# Patient Record
Sex: FEMALE | URBAN - METROPOLITAN AREA
[De-identification: names, ages, dates, MRNs, and addresses within clinical notes are randomized per-mention and may not be internally consistent; named-entity substitution may affect disease eponyms.]

---

## 2023-10-30 LAB
C. TRACHOMATIS, EXTERNAL RESULT: NEGATIVE
N. GONORRHOEAE, EXTERNAL RESULT: NEGATIVE

## 2023-11-16 LAB
ABO, EXTERNAL RESULT: NORMAL
HEP B, EXTERNAL RESULT: NEGATIVE
HIV, EXTERNAL RESULT: NEGATIVE
RPR, EXTERNAL RESULT: NORMAL
RUBELLA TITER, EXTERNAL RESULT: NORMAL

## 2024-06-12 LAB — GBS, EXTERNAL RESULT: NEGATIVE

## 2024-07-03 ENCOUNTER — HOSPITAL ENCOUNTER (OUTPATIENT)
Facility: HOSPITAL | Age: 19
Setting detail: OBSERVATION
Discharge: HOME | End: 2024-07-04
Attending: OBSTETRICS & GYNECOLOGY | Admitting: OBSTETRICS & GYNECOLOGY

## 2024-07-03 VITALS
SYSTOLIC BLOOD PRESSURE: 118 MMHG | OXYGEN SATURATION: 100 % | HEART RATE: 81 BPM | TEMPERATURE: 98.2 F | DIASTOLIC BLOOD PRESSURE: 75 MMHG | RESPIRATION RATE: 18 BRPM

## 2024-07-03 PROBLEM — Z3A.39 39 WEEKS GESTATION OF PREGNANCY: Status: ACTIVE | Noted: 2024-07-03

## 2024-07-03 PROBLEM — O47.1 FALSE LABOR AFTER 37 WEEKS OF GESTATION WITHOUT DELIVERY: Status: ACTIVE | Noted: 2024-07-03

## 2024-07-03 PROCEDURE — G0378 HOSPITAL OBSERVATION PER HR: HCPCS

## 2024-07-03 PROCEDURE — G0379 DIRECT REFER HOSPITAL OBSERV: HCPCS

## 2024-07-03 PROCEDURE — 2580000003 HC RX 258: Performed by: ADVANCED PRACTICE MIDWIFE

## 2024-07-03 RX ORDER — ONDANSETRON 2 MG/ML
4 INJECTION INTRAMUSCULAR; INTRAVENOUS EVERY 6 HOURS PRN
Status: DISCONTINUED | OUTPATIENT
Start: 2024-07-03 | End: 2024-07-04 | Stop reason: HOSPADM

## 2024-07-03 RX ORDER — ACETAMINOPHEN 500 MG
1000 TABLET ORAL EVERY 8 HOURS PRN
Status: DISCONTINUED | OUTPATIENT
Start: 2024-07-03 | End: 2024-07-04 | Stop reason: HOSPADM

## 2024-07-03 RX ORDER — SODIUM CHLORIDE, SODIUM LACTATE, POTASSIUM CHLORIDE, AND CALCIUM CHLORIDE .6; .31; .03; .02 G/100ML; G/100ML; G/100ML; G/100ML
1000 INJECTION, SOLUTION INTRAVENOUS ONCE
Status: COMPLETED | OUTPATIENT
Start: 2024-07-03 | End: 2024-07-03

## 2024-07-03 RX ORDER — ONDANSETRON 4 MG/1
4 TABLET, ORALLY DISINTEGRATING ORAL EVERY 8 HOURS PRN
Status: DISCONTINUED | OUTPATIENT
Start: 2024-07-03 | End: 2024-07-04 | Stop reason: HOSPADM

## 2024-07-03 RX ADMIN — SODIUM CHLORIDE, POTASSIUM CHLORIDE, SODIUM LACTATE AND CALCIUM CHLORIDE 1000 ML: 600; 310; 30; 20 INJECTION, SOLUTION INTRAVENOUS at 21:13

## 2024-07-03 RX ADMIN — SODIUM CHLORIDE, POTASSIUM CHLORIDE, SODIUM LACTATE AND CALCIUM CHLORIDE 1000 ML: 600; 310; 30; 20 INJECTION, SOLUTION INTRAVENOUS at 22:17

## 2024-07-04 PROCEDURE — G0378 HOSPITAL OBSERVATION PER HR: HCPCS

## 2024-07-04 NOTE — H&P
Rate:     Baseline: 140 bpm    Variability: Moderate    Accelerations: Present (15 x 15 bpm)    Decelerations: None  Category: 1  Pelvic:    Membranes:Intact   Cervical Exam:     Position Posterior    Condition Soft  Presentation Vertex  Dilation 3 cms    Effacement 80 %     Station -2    Exam by RN   Pelvis is adequate for vaginal delivery.  Extremites:   Trace bilateral pedal edema.  Full range of motion.  Neuro:    Alert. Oriented.  CN 2 - 12 intact.  Motor and sensory exam grossly normal.      Prenatal Labs   No results found for: \"ABORH\", \"RUBELLAEXT\", \"GRBSEXT\", \"HBSAGEXT\", \"HIVEXT\", \"RPREXT\", \"GONNOEXT\"  No results found for this or any previous visit (from the past 12 hour(s)).      Assessment/Plan:     Principal Problem:    False labor after 37 weeks of gestation without delivery  Active Problems:    39 weeks gestation of pregnancy  Resolved Problems:    * No resolved hospital problems. *      Early versus false  labor  Will observe      Nathan Nguyen MD  7/3/2024

## 2024-07-04 NOTE — PROGRESS NOTES
S: Pt reports she feels her contractions, but that they are not as strong as before/ Pt admits only having drank one large zaynab tumbler in 24 hours, and has been outside today as well. Pt reports +FM. Denies any pelvic pressure, LOF, or VB. Pt reports desiring a membrane sweep.     O:   Vital Signs = WNL   Cat 1 FHR tracing:  Baseline 130's, acels present, no decels, moderate variability. Contractions q 5-7 min apart, moderate strength upon palpation.       SVE: Unchanged.  3.5 cm/ 80%/ -2/ intact BOW/ medium/ posterior.     A:   Cat 1 FHR tracing   Early Labor/ False Labor   Dehydration     P:   Patient is  being discharged home at this time.   Review labor s/s  and precautions and when to come back to the hospital.   Reviewed to increase her water intake to 80 oz -100 oz of water / electrolyte water per day.   Reviewed FKC's.     Pt to go to her scheduled [facility] visit early next week or sooner (this Friday 07/06/2024) if needed.     Pt and her partner verbalizes understanding and agrees with POC.

## 2024-07-04 NOTE — PROGRESS NOTES
2028: Patient arrives to unit alert and ambulatory with c/o contractions every 5-7 min that started around 3-4 pm today. Patient reports positive fetal movement, and denies LOF and vaginal bleeding at this time. She states she was last seen in the office on 7/1 where she was 3/70/-2 and a membrane sweep was performed. This RN obtain SVE at this time, and patient is unchanged from her last exam in office, 3/70/-2.    2107: This RN notified MILENA Swan of patient's arrival and current status. CNM VORB to give patient two LR fluid bolus' and recheck patient via SVE 2 hrs post initial SVE check.     2243: This RN and MD Nguyen at patient's bedside at this time for patient assessment, education, and POC.     2308: This RN performed SVE at this time, patient uncharged at this time, 3/70/-2.      2318: This RN notified MILENA Swan of patient current condition at this time. CNM to bed at bedside shortly.   2328: This RN and MILENA Swan at patient's bedside at this time for patient assessment, education, and POC.     2347: CNM VORB to discharge patient at this time.     0052: This RN at patient bedside at this time for discharge instructions. Patient and family have no further questions at this time.     0057: Patient and family leaving unit at this time alert and ambulatory.

## 2024-07-08 ENCOUNTER — ANESTHESIA EVENT (OUTPATIENT)
Facility: HOSPITAL | Age: 19
End: 2024-07-08
Payer: COMMERCIAL

## 2024-07-08 ENCOUNTER — ANESTHESIA (OUTPATIENT)
Facility: HOSPITAL | Age: 19
End: 2024-07-08
Payer: COMMERCIAL

## 2024-07-08 ENCOUNTER — HOSPITAL ENCOUNTER (INPATIENT)
Facility: HOSPITAL | Age: 19
LOS: 2 days | Discharge: HOME | End: 2024-07-10
Attending: OBSTETRICS & GYNECOLOGY | Admitting: OBSTETRICS & GYNECOLOGY
Payer: COMMERCIAL

## 2024-07-08 PROBLEM — O47.1 FALSE LABOR AFTER 37 WEEKS OF GESTATION WITHOUT DELIVERY: Status: RESOLVED | Noted: 2024-07-03 | Resolved: 2024-07-08

## 2024-07-08 PROBLEM — Z3A.40 40 WEEKS GESTATION OF PREGNANCY: Status: ACTIVE | Noted: 2024-07-03

## 2024-07-08 PROBLEM — O47.9 UTERINE CONTRACTIONS: Status: ACTIVE | Noted: 2024-07-08

## 2024-07-08 LAB
BASOPHILS # BLD: 0 K/UL (ref 0–0.1)
BASOPHILS NFR BLD: 0 % (ref 0–1)
DIFFERENTIAL METHOD BLD: ABNORMAL
EOSINOPHIL # BLD: 0 K/UL (ref 0–0.4)
EOSINOPHIL NFR BLD: 0 % (ref 0–7)
ERYTHROCYTE [DISTWIDTH] IN BLOOD BY AUTOMATED COUNT: 12.7 % (ref 11.5–14.5)
HCT VFR BLD AUTO: 39.5 % (ref 35–47)
HGB BLD-MCNC: 13.7 G/DL (ref 11.5–16)
IMM GRANULOCYTES # BLD AUTO: 0.1 K/UL (ref 0–0.04)
IMM GRANULOCYTES NFR BLD AUTO: 0 % (ref 0–0.5)
LYMPHOCYTES # BLD: 1.7 K/UL (ref 0.8–3.5)
LYMPHOCYTES NFR BLD: 14 % (ref 12–49)
MCH RBC QN AUTO: 34.3 PG (ref 26–34)
MCHC RBC AUTO-ENTMCNC: 34.7 G/DL (ref 30–36.5)
MCV RBC AUTO: 98.8 FL (ref 80–99)
MONOCYTES # BLD: 0.8 K/UL (ref 0–1)
MONOCYTES NFR BLD: 7 % (ref 5–13)
NEUTS SEG # BLD: 9.1 K/UL (ref 1.8–8)
NEUTS SEG NFR BLD: 79 % (ref 32–75)
NRBC # BLD: 0 K/UL (ref 0–0.01)
NRBC BLD-RTO: 0 PER 100 WBC
PLATELET # BLD AUTO: 221 K/UL (ref 150–400)
PMV BLD AUTO: 9.8 FL (ref 8.9–12.9)
RBC # BLD AUTO: 4 M/UL (ref 3.8–5.2)
WBC # BLD AUTO: 11.7 K/UL (ref 3.6–11)

## 2024-07-08 PROCEDURE — 86922 COMPATIBILITY TEST ANTIGLOB: CPT

## 2024-07-08 PROCEDURE — 2500000003 HC RX 250 WO HCPCS: Performed by: NURSE ANESTHETIST, CERTIFIED REGISTERED

## 2024-07-08 PROCEDURE — 6360000002 HC RX W HCPCS: Performed by: NURSE ANESTHETIST, CERTIFIED REGISTERED

## 2024-07-08 PROCEDURE — 36415 COLL VENOUS BLD VENIPUNCTURE: CPT

## 2024-07-08 PROCEDURE — 86644 CMV ANTIBODY: CPT

## 2024-07-08 PROCEDURE — 86920 COMPATIBILITY TEST SPIN: CPT

## 2024-07-08 PROCEDURE — 7210000100 HC LABOR FEE PER 1 HR

## 2024-07-08 PROCEDURE — 51702 INSERT TEMP BLADDER CATH: CPT

## 2024-07-08 PROCEDURE — 85025 COMPLETE CBC W/AUTO DIFF WBC: CPT

## 2024-07-08 PROCEDURE — 86870 RBC ANTIBODY IDENTIFICATION: CPT

## 2024-07-08 PROCEDURE — 2580000003 HC RX 258: Performed by: OBSTETRICS & GYNECOLOGY

## 2024-07-08 PROCEDURE — 86850 RBC ANTIBODY SCREEN: CPT

## 2024-07-08 PROCEDURE — G0378 HOSPITAL OBSERVATION PER HR: HCPCS

## 2024-07-08 PROCEDURE — 86921 COMPATIBILITY TEST INCUBATE: CPT

## 2024-07-08 PROCEDURE — 1100000000 HC RM PRIVATE

## 2024-07-08 PROCEDURE — 3700000025 EPIDURAL BLOCK: Performed by: ANESTHESIOLOGY

## 2024-07-08 PROCEDURE — G0379 DIRECT REFER HOSPITAL OBSERV: HCPCS

## 2024-07-08 PROCEDURE — 00HU33Z INSERTION OF INFUSION DEVICE INTO SPINAL CANAL, PERCUTANEOUS APPROACH: ICD-10-PCS | Performed by: ANESTHESIOLOGY

## 2024-07-08 PROCEDURE — 86901 BLOOD TYPING SEROLOGIC RH(D): CPT

## 2024-07-08 PROCEDURE — 86780 TREPONEMA PALLIDUM: CPT

## 2024-07-08 PROCEDURE — 86900 BLOOD TYPING SEROLOGIC ABO: CPT

## 2024-07-08 RX ORDER — ONDANSETRON 2 MG/ML
4 INJECTION INTRAMUSCULAR; INTRAVENOUS EVERY 6 HOURS PRN
Status: DISCONTINUED | OUTPATIENT
Start: 2024-07-08 | End: 2024-07-10 | Stop reason: HOSPADM

## 2024-07-08 RX ORDER — ONDANSETRON 4 MG/1
4 TABLET, ORALLY DISINTEGRATING ORAL EVERY 6 HOURS PRN
Status: DISCONTINUED | OUTPATIENT
Start: 2024-07-08 | End: 2024-07-10 | Stop reason: HOSPADM

## 2024-07-08 RX ORDER — METHYLERGONOVINE MALEATE 0.2 MG/ML
200 INJECTION INTRAVENOUS PRN
Status: DISCONTINUED | OUTPATIENT
Start: 2024-07-08 | End: 2024-07-10 | Stop reason: HOSPADM

## 2024-07-08 RX ORDER — TERBUTALINE SULFATE 1 MG/ML
0.25 INJECTION, SOLUTION SUBCUTANEOUS
Status: ACTIVE | OUTPATIENT
Start: 2024-07-08 | End: 2024-07-09

## 2024-07-08 RX ORDER — BUPIVACAINE HYDROCHLORIDE 2.5 MG/ML
INJECTION, SOLUTION EPIDURAL; INFILTRATION; INTRACAUDAL PRN
Status: DISCONTINUED | OUTPATIENT
Start: 2024-07-08 | End: 2024-07-09 | Stop reason: SDUPTHER

## 2024-07-08 RX ORDER — MISOPROSTOL 200 UG/1
400 TABLET ORAL PRN
Status: DISCONTINUED | OUTPATIENT
Start: 2024-07-08 | End: 2024-07-10 | Stop reason: HOSPADM

## 2024-07-08 RX ORDER — FENTANYL CITRATE 50 UG/ML
50 INJECTION, SOLUTION INTRAMUSCULAR; INTRAVENOUS
Status: DISCONTINUED | OUTPATIENT
Start: 2024-07-08 | End: 2024-07-10 | Stop reason: HOSPADM

## 2024-07-08 RX ORDER — NALOXONE HYDROCHLORIDE 0.4 MG/ML
INJECTION, SOLUTION INTRAMUSCULAR; INTRAVENOUS; SUBCUTANEOUS PRN
Status: DISCONTINUED | OUTPATIENT
Start: 2024-07-08 | End: 2024-07-10 | Stop reason: HOSPADM

## 2024-07-08 RX ORDER — SODIUM CHLORIDE 9 MG/ML
25 INJECTION, SOLUTION INTRAVENOUS PRN
Status: DISCONTINUED | OUTPATIENT
Start: 2024-07-08 | End: 2024-07-10 | Stop reason: HOSPADM

## 2024-07-08 RX ORDER — SODIUM CHLORIDE, SODIUM LACTATE, POTASSIUM CHLORIDE, CALCIUM CHLORIDE 600; 310; 30; 20 MG/100ML; MG/100ML; MG/100ML; MG/100ML
INJECTION, SOLUTION INTRAVENOUS CONTINUOUS
Status: DISCONTINUED | OUTPATIENT
Start: 2024-07-08 | End: 2024-07-10 | Stop reason: HOSPADM

## 2024-07-08 RX ORDER — EPHEDRINE SULFATE/0.9% NACL/PF 25 MG/5 ML
10 SYRINGE (ML) INTRAVENOUS ONCE
Status: DISCONTINUED | OUTPATIENT
Start: 2024-07-08 | End: 2024-07-10 | Stop reason: HOSPADM

## 2024-07-08 RX ORDER — CARBOPROST TROMETHAMINE 250 UG/ML
250 INJECTION, SOLUTION INTRAMUSCULAR PRN
Status: DISCONTINUED | OUTPATIENT
Start: 2024-07-08 | End: 2024-07-10 | Stop reason: HOSPADM

## 2024-07-08 RX ORDER — SODIUM CHLORIDE, SODIUM LACTATE, POTASSIUM CHLORIDE, AND CALCIUM CHLORIDE .6; .31; .03; .02 G/100ML; G/100ML; G/100ML; G/100ML
500 INJECTION, SOLUTION INTRAVENOUS PRN
Status: DISCONTINUED | OUTPATIENT
Start: 2024-07-08 | End: 2024-07-10 | Stop reason: HOSPADM

## 2024-07-08 RX ORDER — TRANEXAMIC ACID 10 MG/ML
1000 INJECTION, SOLUTION INTRAVENOUS
Status: ACTIVE | OUTPATIENT
Start: 2024-07-08 | End: 2024-07-09

## 2024-07-08 RX ORDER — CETIRIZINE HYDROCHLORIDE 5 MG/1
5 TABLET ORAL DAILY
COMMUNITY

## 2024-07-08 RX ORDER — SODIUM CHLORIDE 0.9 % (FLUSH) 0.9 %
5-40 SYRINGE (ML) INJECTION PRN
Status: DISCONTINUED | OUTPATIENT
Start: 2024-07-08 | End: 2024-07-10 | Stop reason: HOSPADM

## 2024-07-08 RX ORDER — LIDOCAINE HYDROCHLORIDE 10 MG/ML
INJECTION, SOLUTION INFILTRATION; PERINEURAL PRN
Status: DISCONTINUED | OUTPATIENT
Start: 2024-07-08 | End: 2024-07-09 | Stop reason: SDUPTHER

## 2024-07-08 RX ORDER — FENTANYL/BUPIVACAINE/NS/PF 2-1250MCG
10 PLASTIC BAG, INJECTION (ML) INJECTION CONTINUOUS
Status: DISCONTINUED | OUTPATIENT
Start: 2024-07-08 | End: 2024-07-10 | Stop reason: HOSPADM

## 2024-07-08 RX ORDER — SODIUM CHLORIDE 0.9 % (FLUSH) 0.9 %
5-40 SYRINGE (ML) INJECTION EVERY 12 HOURS SCHEDULED
Status: DISCONTINUED | OUTPATIENT
Start: 2024-07-08 | End: 2024-07-10 | Stop reason: HOSPADM

## 2024-07-08 RX ORDER — ACETAMINOPHEN 325 MG/1
650 TABLET ORAL EVERY 4 HOURS PRN
Status: DISCONTINUED | OUTPATIENT
Start: 2024-07-08 | End: 2024-07-10 | Stop reason: HOSPADM

## 2024-07-08 RX ADMIN — SODIUM CHLORIDE, POTASSIUM CHLORIDE, SODIUM LACTATE AND CALCIUM CHLORIDE 500 ML: 600; 310; 30; 20 INJECTION, SOLUTION INTRAVENOUS at 21:29

## 2024-07-08 RX ADMIN — LIDOCAINE HYDROCHLORIDE 3 ML: 10; .005 INJECTION, SOLUTION EPIDURAL; INFILTRATION; INTRACAUDAL; PERINEURAL at 21:57

## 2024-07-08 RX ADMIN — Medication 10 ML/HR: at 22:14

## 2024-07-08 RX ADMIN — LIDOCAINE HYDROCHLORIDE 3 ML: 10 INJECTION, SOLUTION INFILTRATION; PERINEURAL at 21:51

## 2024-07-08 RX ADMIN — SODIUM CHLORIDE, POTASSIUM CHLORIDE, SODIUM LACTATE AND CALCIUM CHLORIDE: 600; 310; 30; 20 INJECTION, SOLUTION INTRAVENOUS at 22:02

## 2024-07-08 RX ADMIN — BUPIVACAINE HYDROCHLORIDE 6 ML: 2.5 INJECTION, SOLUTION EPIDURAL; INFILTRATION; INTRACAUDAL; PERINEURAL at 22:02

## 2024-07-08 RX ADMIN — SODIUM CHLORIDE, POTASSIUM CHLORIDE, SODIUM LACTATE AND CALCIUM CHLORIDE 500 ML: 600; 310; 30; 20 INJECTION, SOLUTION INTRAVENOUS at 19:47

## 2024-07-08 ASSESSMENT — ENCOUNTER SYMPTOMS
EYE PAIN: 0
TROUBLE SWALLOWING: 0
RHINORRHEA: 0
NAUSEA: 0
CHEST TIGHTNESS: 0
DIARRHEA: 1
VOMITING: 0
SHORTNESS OF BREATH: 0
BACK PAIN: 1
CONSTIPATION: 0

## 2024-07-08 NOTE — PROGRESS NOTES
1900: Bedside and Verbal shift change report given to Erin RN (oncoming nurse) by Lizzie RN (offgoing nurse). Report included the following information Nurse Handoff Report, Index, Intake/Output, MAR, and Recent Results.     2140: KASANDRA Handy at bedside for epidural placement. Pt sitting up on side of bed. This RN attempting to trace EFM.    2150: Time out    2157:  Test dose    2202: Bolus dose.     2345: MILENA Finn at bedside evaluating pt. SVE performed 7/100/-1. AROM performed at this time. Moderate amount of clear liquid noted.     0242: This RN at bedside. SVE performed 10/100/+1.     0247: Patient actively pushing.  RN remains in continuous attendance at the bedside.  Assessment & evaluation of fetal heart rate ongoing via continuous EFM.    0313: RN remained at bedside throughout pushing.  EFM continuously assessed.  Vaginal delivery of viable infant.    0530: This RN assisting pt to restroom. Pt tolerating activity well. Pt unable to void at this time. Pads and gown changed.

## 2024-07-08 NOTE — H&P
RBC 4.00 3.80 - 5.20 M/uL    Hemoglobin 13.7 11.5 - 16.0 g/dL    Hematocrit 39.5 35.0 - 47.0 %    MCV 98.8 80.0 - 99.0 FL    MCH 34.3 (H) 26.0 - 34.0 PG    MCHC 34.7 30.0 - 36.5 g/dL    RDW 12.7 11.5 - 14.5 %    Platelets 221 150 - 400 K/uL    MPV 9.8 8.9 - 12.9 FL    Nucleated RBCs 0.0 0  WBC    nRBC 0.00 0.00 - 0.01 K/uL    Neutrophils % 79 (H) 32 - 75 %    Lymphocytes % 14 12 - 49 %    Monocytes % 7 5 - 13 %    Eosinophils % 0 0 - 7 %    Basophils % 0 0 - 1 %    Immature Granulocytes % 0 0.0 - 0.5 %    Neutrophils Absolute 9.1 (H) 1.8 - 8.0 K/UL    Lymphocytes Absolute 1.7 0.8 - 3.5 K/UL    Monocytes Absolute 0.8 0.0 - 1.0 K/UL    Eosinophils Absolute 0.0 0.0 - 0.4 K/UL    Basophils Absolute 0.0 0.0 - 0.1 K/UL    Immature Granulocytes Absolute 0.1 (H) 0.00 - 0.04 K/UL    Differential Type AUTOMATED     HIV, External Result   Result Value Ref Range    HIV, External Result Negative    RPR, External Lab   Result Value Ref Range    RPR, External Result Non-reactive    Hepatitis B, External Result   Result Value Ref Range    Hep B, External Result Negative    Rubella Titer, External Result   Result Value Ref Range    Rubella Titer, External Result Immune    TYPE AND SCREEN   Result Value Ref Range    Crossmatch expiration date 2024,2359     ABO/Rh PENDING     Antibody Screen PENDING    ABO, External Result   Result Value Ref Range    ABO, External Result B Negative          Impression/Plan:     Principal Problem:    Uterine contractions  Resolved Problems:    * No resolved hospital problems. *     19 yo  @ 40w1d in labor    Plan: Admit for labor. Group B Strep negative.    Epidural upon request.    Reviewed risks of vaginal delivery/operative delivery/ section to include but not be limited to risks of fetal heart rate abnormalities requiring emergency  section and its associated risks including risk of damage to uterus and surrounding organs including bowel, bladder, nerves, blood

## 2024-07-08 NOTE — PROGRESS NOTES
1840: patient arrived ambulatory with partner with c/o of ctx every 4-5 min since 315 pm. She reports having her 3rd membranes sweep around 12 pm this morning. Has +FM, denies HA, vision changes, LOF and vaginal bleeding.    1848: Dr Jalloh at bedside. SVE per MD 5/100/-1.     1900: sbar given to Abel MURCIA

## 2024-07-09 LAB
ABO + RH BLD: NORMAL
BLD PROD TYP BPU: NORMAL
BLOOD BANK DISPENSE STATUS: NORMAL
BLOOD GROUP ANTIBODIES SERPL: NORMAL
BLOOD GROUP ANTIBODIES SERPL: NORMAL
BPU ID: NORMAL
CROSSMATCH RESULT: NORMAL
SPECIMEN EXP DATE BLD: NORMAL
UNIT DIVISION: 0

## 2024-07-09 PROCEDURE — 36415 COLL VENOUS BLD VENIPUNCTURE: CPT

## 2024-07-09 PROCEDURE — 6360000002 HC RX W HCPCS: Performed by: OBSTETRICS & GYNECOLOGY

## 2024-07-09 PROCEDURE — 1120000000 HC RM PRIVATE OB

## 2024-07-09 PROCEDURE — 94761 N-INVAS EAR/PLS OXIMETRY MLT: CPT

## 2024-07-09 PROCEDURE — 2700000000 HC OXYGEN THERAPY PER DAY

## 2024-07-09 PROCEDURE — 7220000101 HC DELIVERY VAGINAL/SINGLE

## 2024-07-09 PROCEDURE — 6370000000 HC RX 637 (ALT 250 FOR IP): Performed by: OBSTETRICS & GYNECOLOGY

## 2024-07-09 PROCEDURE — 6370000000 HC RX 637 (ALT 250 FOR IP)

## 2024-07-09 PROCEDURE — 3700000156 HC EPIDURAL ANESTHESIA: Performed by: NURSE ANESTHETIST, CERTIFIED REGISTERED

## 2024-07-09 PROCEDURE — 2580000003 HC RX 258: Performed by: OBSTETRICS & GYNECOLOGY

## 2024-07-09 PROCEDURE — 7210000100 HC LABOR FEE PER 1 HR

## 2024-07-09 RX ORDER — DOCUSATE SODIUM 100 MG/1
100 CAPSULE, LIQUID FILLED ORAL 2 TIMES DAILY
Status: DISCONTINUED | OUTPATIENT
Start: 2024-07-09 | End: 2024-07-10 | Stop reason: HOSPADM

## 2024-07-09 RX ORDER — OXYCODONE HYDROCHLORIDE AND ACETAMINOPHEN 5; 325 MG/1; MG/1
1 TABLET ORAL EVERY 4 HOURS PRN
Status: DISCONTINUED | OUTPATIENT
Start: 2024-07-09 | End: 2024-07-10 | Stop reason: HOSPADM

## 2024-07-09 RX ORDER — ACETAMINOPHEN 500 MG
1000 TABLET ORAL EVERY 8 HOURS SCHEDULED
Status: DISCONTINUED | OUTPATIENT
Start: 2024-07-09 | End: 2024-07-10 | Stop reason: HOSPADM

## 2024-07-09 RX ORDER — OXYCODONE HYDROCHLORIDE 5 MG/1
5 TABLET ORAL ONCE
Status: COMPLETED | OUTPATIENT
Start: 2024-07-09 | End: 2024-07-09

## 2024-07-09 RX ORDER — IBUPROFEN 800 MG/1
800 TABLET ORAL EVERY 8 HOURS SCHEDULED
Status: DISCONTINUED | OUTPATIENT
Start: 2024-07-09 | End: 2024-07-10 | Stop reason: HOSPADM

## 2024-07-09 RX ORDER — SODIUM CHLORIDE 9 MG/ML
INJECTION, SOLUTION INTRAVENOUS PRN
Status: DISCONTINUED | OUTPATIENT
Start: 2024-07-09 | End: 2024-07-10 | Stop reason: HOSPADM

## 2024-07-09 RX ORDER — ONDANSETRON 2 MG/ML
4 INJECTION INTRAMUSCULAR; INTRAVENOUS EVERY 6 HOURS PRN
Status: DISCONTINUED | OUTPATIENT
Start: 2024-07-09 | End: 2024-07-10 | Stop reason: HOSPADM

## 2024-07-09 RX ORDER — SODIUM CHLORIDE 0.9 % (FLUSH) 0.9 %
5-40 SYRINGE (ML) INJECTION PRN
Status: DISCONTINUED | OUTPATIENT
Start: 2024-07-09 | End: 2024-07-10 | Stop reason: HOSPADM

## 2024-07-09 RX ORDER — MAGNESIUM CARB/ALUMINUM HYDROX 105-160MG
60 TABLET,CHEWABLE ORAL ONCE
Status: DISCONTINUED | OUTPATIENT
Start: 2024-07-09 | End: 2024-07-10 | Stop reason: HOSPADM

## 2024-07-09 RX ORDER — SODIUM CHLORIDE 0.9 % (FLUSH) 0.9 %
5-40 SYRINGE (ML) INJECTION EVERY 12 HOURS SCHEDULED
Status: DISCONTINUED | OUTPATIENT
Start: 2024-07-09 | End: 2024-07-10 | Stop reason: HOSPADM

## 2024-07-09 RX ORDER — ONDANSETRON 4 MG/1
4 TABLET, ORALLY DISINTEGRATING ORAL EVERY 6 HOURS PRN
Status: DISCONTINUED | OUTPATIENT
Start: 2024-07-09 | End: 2024-07-10 | Stop reason: HOSPADM

## 2024-07-09 RX ORDER — LANOLIN/MINERAL OIL
LOTION (ML) TOPICAL PRN
Status: DISCONTINUED | OUTPATIENT
Start: 2024-07-09 | End: 2024-07-10 | Stop reason: HOSPADM

## 2024-07-09 RX ADMIN — OXYCODONE HYDROCHLORIDE 5 MG: 5 TABLET ORAL at 18:11

## 2024-07-09 RX ADMIN — OXYTOCIN 87.3 MILLI-UNITS/MIN: 10 INJECTION, SOLUTION INTRAMUSCULAR; INTRAVENOUS at 03:38

## 2024-07-09 RX ADMIN — ACETAMINOPHEN 1000 MG: 500 TABLET ORAL at 12:05

## 2024-07-09 RX ADMIN — IBUPROFEN 800 MG: 800 TABLET, FILM COATED ORAL at 17:59

## 2024-07-09 RX ADMIN — ACETAMINOPHEN 1000 MG: 500 TABLET ORAL at 20:26

## 2024-07-09 RX ADMIN — DOCUSATE SODIUM 100 MG: 100 CAPSULE, LIQUID FILLED ORAL at 07:52

## 2024-07-09 RX ADMIN — IBUPROFEN 800 MG: 800 TABLET, FILM COATED ORAL at 10:08

## 2024-07-09 RX ADMIN — DOCUSATE SODIUM 100 MG: 100 CAPSULE, LIQUID FILLED ORAL at 20:26

## 2024-07-09 RX ADMIN — ACETAMINOPHEN 1000 MG: 500 TABLET ORAL at 04:56

## 2024-07-09 ASSESSMENT — PAIN SCALES - GENERAL
PAINLEVEL_OUTOF10: 3
PAINLEVEL_OUTOF10: 4
PAINLEVEL_OUTOF10: 0
PAINLEVEL_OUTOF10: 7
PAINLEVEL_OUTOF10: 5
PAINLEVEL_OUTOF10: 6

## 2024-07-09 ASSESSMENT — PAIN DESCRIPTION - DESCRIPTORS
DESCRIPTORS: SORE
DESCRIPTORS: DISCOMFORT;SORE;CRAMPING
DESCRIPTORS: ACHING

## 2024-07-09 ASSESSMENT — PAIN DESCRIPTION - LOCATION
LOCATION: COCCYX
LOCATION: COCCYX
LOCATION: HEAD
LOCATION: COCCYX
LOCATION: COCCYX
LOCATION: COCCYX;ABDOMEN

## 2024-07-09 ASSESSMENT — PAIN - FUNCTIONAL ASSESSMENT: PAIN_FUNCTIONAL_ASSESSMENT: ACTIVITIES ARE NOT PREVENTED

## 2024-07-09 ASSESSMENT — PAIN DESCRIPTION - ORIENTATION: ORIENTATION: LOWER;POSTERIOR

## 2024-07-09 NOTE — PROGRESS NOTES
1805 Patient complained on worsening coccyx pain. This RN has been medicating patient with tylenol and motrin as scheduled and providing comfort care with ice packs and position readjustment for relieving pressure. Patient's pain scale was a steady 3/10 all day and is now 7/10 with no relief from pharmaceutical and non-pharmaceutical pain interventions. RN assessed the area on back, pain is targeted right on the coccyx, no redness, swelling or bruising noted in the area. This RN called Dr. Patterson and notified of worsening pain, failed interventions, and RN assessment findings. RN received a one time order for oxycodone for pain relief, see orders and MAR for administration. MD also notified RN to call the hospitalist if pain does not resolve with oxycodone and for further evaluation of patient.     1914 Shift report given to DIVINA Irby. All questions answered.

## 2024-07-09 NOTE — LACTATION NOTE
This note was copied from a baby's chart.  Rounding for LC consult.  FOB holding infant in arms and mother in bathroom.  Family states feeds are going well for both baby and mother. Mother encouraged to call next feed.

## 2024-07-09 NOTE — L&D DELIVERY NOTE
Brendon, 19yo,  @ 40w2d admitted for spontaneous labor. She labored and progressed to complete/complete/+2. Brendon pushed with excellent effort over 30 minutes with contractions.  of live female  in DANNIELLE position. Head, shoulders delivered with ease. No nuchal cord. Body followed with slight difficulty due to loss of contraction, maternal fatigue.  lifted to mother's abdomen for drying and stimulation. Cord double clamped and cut by CNM and  noted to be vigorous on transfer to warm for nursery evaluation. APGAR'S 9/9. Cord blood collected. Placenta delivered spontaneously, intact, three vessel cord. Fundus firm with massage. Oxytocin infusing.  mL. Vulva, vagina and perineum inspected. Perineum intact. Hemostatic right labial abrasion identified. No repair required. Mother and baby girl \"Lala\" stable and bonding skin to skin. Mother and FOB happy with birth experience.    Susannah, Anthony Olivas [[id number]]      Labor Events      Cervical Ripening Date/Time:        Rupture Date/Time:  24 23:45:00   Rupture Type: AROM  Fluid Color: Clear  Fluid Odor: None              Anesthesia    Method: Epidural       Labor Event Times      Labor onset date/time:        Dilation complete date/time:  24 02:42:00     Start pushing date/time:  2024 02:47:00   Decision date/time (emergent ):            Delivery Details      Delivery Date: 24 Delivery Time: 03:13:00   Delivery Type: Vaginal, Spontaneous               Presentation    Presentation: Vertex  Position: Right  _: Occiput  _: Anterior       Shoulder Dystocia    Shoulder Dystocia Present?: No       Assisted Delivery Details    Forceps Attempted?: No  Vacuum Extractor Attempted?: No                           Cord    Vessels: 3 Vessels  Complications: None  Delayed Cord Clamping?: Yes  Cord Clamped Date/Time: 2024 03:13:15  Cord Blood Disposition: Lab  Gases Sent?: No              Placenta    Date/Time: 2024

## 2024-07-09 NOTE — ANESTHESIA PRE PROCEDURE
bag  10 Units IntraVENous PRN Inessa Jalloh MD       • methylergonovine (METHERGINE) injection 200 mcg  200 mcg IntraMUSCular PRN Inessa Jalloh MD       • carboprost (HEMABATE) injection 250 mcg  250 mcg IntraMUSCular PRN Inessa Jalloh MD       • miSOPROStol (CYTOTEC) tablet 400 mcg  400 mcg Buccal PRN Inessa Jalloh MD       • tranexamic acid-NaCl IVPB premix 1,000 mg  1,000 mg IntraVENous Once PRN Inessa Jalloh MD       • acetaminophen (TYLENOL) tablet 650 mg  650 mg Oral Q4H PRN Inessa Jalloh MD       • benzocaine-menthol (DERMOPLAST) 20-0.5 % spray   Topical PRN Inessa Jalloh MD       • fentaNYL (SUBLIMAZE) injection 50 mcg  50 mcg IntraVENous Q1H PRN Inessa Jalloh MD           Allergies:    Allergies   Allergen Reactions   • Latex Swelling       Problem List:    Patient Active Problem List   Diagnosis Code   • 40 weeks gestation of pregnancy Z3A.40   • Uterine contractions O47.9       Past Medical History:  History reviewed. No pertinent past medical history.    Past Surgical History:  History reviewed. No pertinent surgical history.    Social History:    Social History     Tobacco Use   • Smoking status: Never   • Smokeless tobacco: Never   Substance Use Topics   • Alcohol use: Not Currently                                Counseling given: Not Answered      Vital Signs (Current):   Vitals:    07/08/24 1927   BP: 120/73   Pulse: 71   Resp: 18   Temp: 37 °C (98.6 °F)   TempSrc: Oral   SpO2: 99%                                              BP Readings from Last 3 Encounters:   07/08/24 120/73   07/03/24 118/75       NPO Status:                                                                                 BMI:   Wt Readings from Last 3 Encounters:   No data found for Wt     There is no height or weight on file to calculate BMI.    CBC:   Lab Results   Component Value Date/Time    WBC 11.7 07/08/2024 07:43 PM    RBC 4.00 07/08/2024 07:43 PM

## 2024-07-09 NOTE — ANESTHESIA PROCEDURE NOTES
Epidural Block    Patient location during procedure: OB  Start time: 7/8/2024 9:48 PM  End time: 7/8/2024 10:04 PM  Reason for block: labor epidural  Staffing  Performed: resident/CRNA   Anesthesiologist: Alicia Jerez MD  Resident/CRNA: Mavis Handy APRN - CRNA  Performed by: Mavis Handy APRN - CRNA  Authorized by: Mavis Handy APRN - CRNA    Epidural  Patient position: sitting  Prep: ChloraPrep  Patient monitoring: continuous pulse ox and frequent blood pressure checks  Approach: midline  Location: L4-5  Injection technique: IVONNE saline  Provider prep: mask  Needle  Needle type: Tuohy   Needle gauge: 17 G  Needle length: 3.5 in  Needle insertion depth: 5 cm  Catheter type: end hole  Catheter size: 19 G  Catheter at skin depth: 12 cm  Test dose: negativeCatheter Secured: tegaderm and tape  Assessment  Hemodynamics: stable  Attempts: 1  Outcomes: patient tolerated procedure well  Additional Notes  Epidural placed as noted without difficulty  Preanesthetic Checklist  Completed: patient identified, IV checked, site marked, risks and benefits discussed, surgical/procedural consents, equipment checked, pre-op evaluation, timeout performed, anesthesia consent given, oxygen available, monitors applied/VS acknowledged, fire risk safety assessment completed and verbalized and blood product R/B/A discussed and consented

## 2024-07-09 NOTE — PROGRESS NOTES
Labor Note    Brendon Davalos  [id number]  2005   40w2d      S:  Coping well with contractions s/p epidural placement. Denies pain at this time. Reports occasional vaginal pressure.    O:    /71   Pulse 84   Temp 98.6 °F (37 °C) (Oral)   Resp 18   SpO2 100%        SVE: 6-7/100/-1, vertex by sutures. Membranes AROM'd at this time with pt consent. Moderate clear fluid returned.    FHT reviewed, Reactive, CAT I   Baseline: 150 bpm  Moderate variability  Accels: present  Decels: None  Ctx/[place]: 2-3 min, moderate to palpation, resting tone soft between.    A/P:  18 y.o.  @ 40w2d admitted with spontaneous labor. Pregnancy uncomplicated.  - GBS Neg  - CAT I FHT    SVE performed with pt consent. 6/100/-1. AROM offered with r/b/a and pt agreeable.  AROM performed without difficulty, moderate clear fluid returned. SVE now 7/100/-1.  Encouraged rest and regular position changes with peanut ball.  Will plan to recheck SVE with pt reports of constant rectal pressure.    - FWB:  CEFM/[place]levna  - Labor course Expectant  - Pain control - Epidural infusing  - Anticipate .      Signed:  JANET Luo

## 2024-07-09 NOTE — CARE COORDINATION
7/9/2024  11:38 AM    CM met with ROSA to complete initial assessment and begin discharge planning.  MOB verified and confirmed demographics.  ROSA lives with family, at the address on file. ROSA reports she has good family support, and feels like she has the support she needs when she returns home.  ROSA plans to breast feed baby and has pump to use at home.  Peds list provided to ROSA. ROSA has car seat, bassinet/crib, clothing, bottles and all necessary supplies for baby.        07/09/24 8077   Service Assessment   Patient Orientation Alert and Oriented   Cognition Alert   History Provided By Patient   Primary Caregiver Self   Support Systems Spouse/Significant Other   PCP Verified by CM Yes   Last Visit to PCP Within last 3 months   Prior Functional Level Independent in ADLs/IADLs   Current Functional Level Independent in ADLs/IADLs   Can patient return to prior living arrangement Yes   Ability to make needs known: Good   Family able to assist with home care needs: Yes   Would you like for me to discuss the discharge plan with any other family members/significant others, and if so, who? No   Financial Resources Other (Comment)     Gagan Jonas CM

## 2024-07-10 VITALS
HEART RATE: 82 BPM | DIASTOLIC BLOOD PRESSURE: 64 MMHG | OXYGEN SATURATION: 100 % | RESPIRATION RATE: 18 BRPM | TEMPERATURE: 97.7 F | SYSTOLIC BLOOD PRESSURE: 92 MMHG

## 2024-07-10 LAB — T PALLIDUM AB SER QL IA: NON REACTIVE

## 2024-07-10 PROCEDURE — 6370000000 HC RX 637 (ALT 250 FOR IP)

## 2024-07-10 PROCEDURE — 94761 N-INVAS EAR/PLS OXIMETRY MLT: CPT

## 2024-07-10 PROCEDURE — 86900 BLOOD TYPING SEROLOGIC ABO: CPT

## 2024-07-10 PROCEDURE — 6370000000 HC RX 637 (ALT 250 FOR IP): Performed by: OBSTETRICS & GYNECOLOGY

## 2024-07-10 PROCEDURE — 6360000002 HC RX W HCPCS: Performed by: OBSTETRICS & GYNECOLOGY

## 2024-07-10 PROCEDURE — 86901 BLOOD TYPING SEROLOGIC RH(D): CPT

## 2024-07-10 PROCEDURE — 36415 COLL VENOUS BLD VENIPUNCTURE: CPT

## 2024-07-10 PROCEDURE — 85461 HEMOGLOBIN FETAL: CPT

## 2024-07-10 PROCEDURE — 96372 THER/PROPH/DIAG INJ SC/IM: CPT

## 2024-07-10 RX ORDER — IBUPROFEN 800 MG/1
800 TABLET ORAL EVERY 8 HOURS SCHEDULED
Qty: 120 TABLET | Refills: 0 | Status: SHIPPED | OUTPATIENT
Start: 2024-07-10

## 2024-07-10 RX ADMIN — HUMAN RHO(D) IMMUNE GLOBULIN 300 MCG: 300 INJECTION, SOLUTION INTRAMUSCULAR at 13:19

## 2024-07-10 RX ADMIN — DOCUSATE SODIUM 100 MG: 100 CAPSULE, LIQUID FILLED ORAL at 07:57

## 2024-07-10 RX ADMIN — OXYCODONE AND ACETAMINOPHEN 1 TABLET: 5; 325 TABLET ORAL at 03:34

## 2024-07-10 RX ADMIN — IBUPROFEN 800 MG: 800 TABLET, FILM COATED ORAL at 03:37

## 2024-07-10 RX ADMIN — OXYCODONE AND ACETAMINOPHEN 1 TABLET: 5; 325 TABLET ORAL at 12:15

## 2024-07-10 RX ADMIN — OXYCODONE AND ACETAMINOPHEN 1 TABLET: 5; 325 TABLET ORAL at 07:57

## 2024-07-10 ASSESSMENT — PAIN DESCRIPTION - LOCATION
LOCATION: ABDOMEN;COCCYX
LOCATION: BACK
LOCATION: BACK

## 2024-07-10 ASSESSMENT — PAIN SCALES - GENERAL
PAINLEVEL_OUTOF10: 4
PAINLEVEL_OUTOF10: 4
PAINLEVEL_OUTOF10: 6

## 2024-07-10 ASSESSMENT — PAIN DESCRIPTION - ORIENTATION
ORIENTATION: LOWER;POSTERIOR
ORIENTATION: LOWER
ORIENTATION: LOWER

## 2024-07-10 ASSESSMENT — PAIN DESCRIPTION - DESCRIPTORS
DESCRIPTORS: ACHING
DESCRIPTORS: DISCOMFORT;CRAMPING
DESCRIPTORS: ACHING

## 2024-07-10 ASSESSMENT — PAIN - FUNCTIONAL ASSESSMENT
PAIN_FUNCTIONAL_ASSESSMENT: ACTIVITIES ARE NOT PREVENTED

## 2024-07-10 NOTE — PROGRESS NOTES
Post-Partum Day Number 1 Progress Note    Brendon Davalos     Assessment: Doing well, post partum day 1    Plan:  - Continue routine postpartum and perineal care as well as maternal education.  - Tailbone bone: Discussed that there is inflammation that takes time to improve, up to a few weeks. Rec ice/heat, keeping pressure of of it as much as possible and taking motrin.  - Plan discharge home today.    Information for the patient's :  Anthony Davalos [[id number]]   Vaginal, Spontaneous  Patient doing well without significant complaint.  Voiding without difficulty, normal lochia.    Vitals:  BP 92/64   Pulse 82   Temp 97.7 °F (36.5 °C) (Oral)   Resp 16   SpO2 100%   Breastfeeding Unknown   Temp (24hrs), Av.8 °F (36.6 °C), Min:97.7 °F (36.5 °C), Max:97.9 °F (36.6 °C)        Exam:   Patient without distress.                  Fundus firm, nontender per nursing fundal checks.                Perineum with normal lochia noted per nursing assessment.                Lower extremities are negative for pathological edema.    Labs:     Lab Results   Component Value Date/Time    WBC 11.7 2024 07:43 PM    HGB 13.7 2024 07:43 PM    HCT 39.5 2024 07:43 PM     2024 07:43 PM       No results found for this or any previous visit (from the past 24 hour(s)).

## 2024-07-10 NOTE — ANESTHESIA POSTPROCEDURE EVALUATION
Department of Anesthesiology  Postprocedure Note    Patient: Brendon Davalos  MRN: [id number]  YOB: 2005  Date of evaluation: 7/10/2024    Procedure Summary       Date: 07/08/24 Room / Location:     Anesthesia Start: 2148 Anesthesia Stop: 07/09/24 0313    Procedure: Labor Analgesia Diagnosis:     Scheduled Providers:  Responsible Provider: Alicia Jerez MD    Anesthesia Type: epidural ASA Status: 2            Anesthesia Type: No value filed.    Vadim Phase I:      Vadim Phase II:      Anesthesia Post Evaluation    Comments: No complications      No notable events documented.

## 2024-07-10 NOTE — PROGRESS NOTES
2337: Notified Dr. Cobian of patient's continued coccyx pain. Orders received for percocet PRN q4 with reevaluation in the morning.

## 2024-07-10 NOTE — LACTATION NOTE
This note was copied from a baby's chart.  Discharge:  Successful breast feeding session(s) observed. Infant's voids and stools are appropriate for age.  Mom verbalizes and demonstrates gained breastfeeding skills.  Importance of monitoring feedings and output on first week breastfeeding log reviewed. Aware to follow up with pediatrician within 1-2 days of discharge for pediatric assessment and review.  Encouraged to call warm line number for any questions/concerns that may arise.     Hand Expression Education:  Mom taught how to manually hand express her colostrum.  Emphasized the importance of providing infant with valuable colostrum as infant rests skin to skin at breast.  Aware to avoid extended periods of non-feeding.  Aware to offer 10-20+ drops of colostrum every 2-3 hours until infant is latching and nursing effectively.  Taught the rationale behind this low tech but highly effective evidence based practice.    Placed  prone on mother's chest, near breast, to facilitate 's innate breastfeeding behaviors.  Placing  prone on mother's chest also puts pressure on neurophysiologic pressure points that stimulate complimentary movements in both the  and mother  to facilitate  led latching.  Allowing the baby to lead the breastfeeding process empowers mother to have the needed confidence to be successful at breastfeeding.    Sidelying:  Taught mom to  lie on side, facing baby. Arm under pillow for comfort. . Baby's chest should face mother's chest, and baby's nose should be level with nipple. Try to position baby so their ear, shoulder, and hip are in one line. This will help them get milk more easily. Pull baby close. In this position, mom can cradle  baby's back with her forearm and guide them to latch.     Pt will successfully establish breastfeeding by feeding in response to early feeding cues   or wake every 3h, will obtain deep latch, and will keep log of feedings/output.

## 2024-07-10 NOTE — DISCHARGE SUMMARY
Obstetrical Discharge Summary     Name: Brendon Davalos MRN: [id number]  SSN: [id number]    YOB: 2005  Age: 18 y.o.  Sex: female      Admit Date: 2024    Discharge Date: 7/10/2024     Admitting Physician: Inessa Jalloh MD     Attending Physician:  Mel Harding MD     Admission Diagnoses: Uterine contractions [O47.9]    Discharge Diagnoses:   Information for the patient's :  Anthony Davalos [[id number]]   @[phone]@      Additional Diagnoses:  No components found for: \"OBEXTABORH\", \"OBEXTABSCRN\", \"OBEXTRUBELLA\", \"OBEXTGRBS\"    Hospital Course: Normal hospital course following the delivery.    Patient Instructions:   Current Discharge Medication List        CONTINUE these medications which have NOT CHANGED    Details   cetirizine (ZYRTEC) 5 MG tablet Take 1 tablet by mouth daily      Prenatal MV-Min-Fe Fum-FA-DHA (PRENATAL 1 PO) Take by mouth      docusate (COLACE) 50 MG/5ML liquid Take 5 mLs by mouth daily             Disposition at Discharge: Home or self care    Condition at Discharge: Stable    Reference my discharge instructions.    No follow-ups on file.     Signed By:  Saloni Rausch MD     July 10, 2024

## 2024-07-11 LAB
ABO + RH BLD: NORMAL
BLD PROD TYP BPU: NORMAL
BLOOD BANK BLOOD PRODUCT EXPIRATION DATE: NORMAL
BLOOD BANK DISPENSE STATUS: NORMAL
BPU ID: NORMAL
FETAL SCREEN: NORMAL
UNIT DIVISION: 0
UNIT ISSUE DATE/TIME: NORMAL